# Patient Record
Sex: FEMALE | Race: WHITE | NOT HISPANIC OR LATINO | Employment: OTHER | URBAN - METROPOLITAN AREA
[De-identification: names, ages, dates, MRNs, and addresses within clinical notes are randomized per-mention and may not be internally consistent; named-entity substitution may affect disease eponyms.]

---

## 2017-08-16 ENCOUNTER — OFFICE VISIT (OUTPATIENT)
Dept: URGENT CARE | Facility: CLINIC | Age: 67
End: 2017-08-16
Payer: MEDICARE

## 2017-08-16 VITALS
SYSTOLIC BLOOD PRESSURE: 102 MMHG | HEIGHT: 65 IN | DIASTOLIC BLOOD PRESSURE: 62 MMHG | WEIGHT: 104 LBS | TEMPERATURE: 98.1 F | OXYGEN SATURATION: 96 % | HEART RATE: 78 BPM | RESPIRATION RATE: 12 BRPM | BODY MASS INDEX: 17.33 KG/M2

## 2017-08-16 DIAGNOSIS — M32.9 SYSTEMIC LUPUS ERYTHEMATOSUS, UNSPECIFIED SLE TYPE, UNSPECIFIED ORGAN INVOLVEMENT STATUS (HCC): Primary | ICD-10-CM

## 2017-08-16 DIAGNOSIS — L50.9 URTICARIA: ICD-10-CM

## 2017-08-16 PROCEDURE — 99204 OFFICE O/P NEW MOD 45 MIN: CPT | Performed by: PHYSICIAN ASSISTANT

## 2017-08-16 RX ORDER — TRIAMCINOLONE ACETONIDE 1 MG/G
1 OINTMENT TOPICAL 2 TIMES DAILY
Qty: 30 G | Refills: 1 | Status: SHIPPED | OUTPATIENT
Start: 2017-08-16

## 2017-08-16 RX ORDER — PANTOPRAZOLE SODIUM 40 MG/1
40 TABLET, DELAYED RELEASE ORAL DAILY
COMMUNITY

## 2017-08-16 RX ORDER — VALSARTAN 80 MG/1
80 TABLET ORAL DAILY
COMMUNITY

## 2017-08-16 RX ORDER — PREDNISONE 10 MG/1
TABLET ORAL
Qty: 48 TAB | Refills: 0 | Status: SHIPPED | OUTPATIENT
Start: 2017-08-16 | End: 2017-09-05

## 2017-08-16 RX ORDER — PANTOPRAZOLE SODIUM 40 MG/1
40 TABLET, DELAYED RELEASE ORAL DAILY
Qty: 30 TAB | Refills: 1 | Status: SHIPPED | OUTPATIENT
Start: 2017-08-16

## 2017-08-16 ASSESSMENT — ENCOUNTER SYMPTOMS
NEUROLOGICAL NEGATIVE: 1
PSYCHIATRIC NEGATIVE: 1
RESPIRATORY NEGATIVE: 1
CONSTITUTIONAL NEGATIVE: 1
CARDIOVASCULAR NEGATIVE: 1
CONSTIPATION: 1
DIARRHEA: 1
ABDOMINAL PAIN: 1
EYES NEGATIVE: 1

## 2017-08-16 NOTE — PROGRESS NOTES
Subjective:      Fátima Dobson is a 67 y.o. female who presents with Other            Other  Associated symptoms include abdominal pain and a rash.     Chief Complaint   Patient presents with   • Other     Hives       HPI:  Fátima Dobson is a 67 y.o. female who presents with hives for 3 days.      Has lupus and sjogren's.  WAs put on prednisone pulse and taper for 3 weeks earlier in the spring and did improve.  Took benadryl and antihistamines and sudafed.  Patient denies HA, SOB, chest pain, palpitations, fever, chills, or n/v/d.    Father passed away from renal involvement of lupus.  Patient has had 2 miscarriages.  No evidence of renal involvement.    Did recover from lower respiratory tract infection in June and was on a sulfa based antibiotic.  Patient states she did notice a slight side effect to the sulfa abx.  Flare (rash) started thereafter.       Past Medical History   Diagnosis Date   • Hypertension    • Lupus (CMS-HCC)    • Indigestion    • Other specified disorder of intestines    • Pain    • Bronchitis      fall of 2014   • History of Guillain-Cantril syndrome 2006   • Cancer (CMS-[facility]) Nov or Dec 2014     skin, basal cell   • Pneumonia 2006     and just about yearly   • Anesthesia      woken up during surgery   • History of Sjogren's disease        Past Surgical History   Procedure Laterality Date   • Hip arthroplasty total     • Other orthopedic surgery  1/8/2008     Left rotator cuff   • Other orthopedic surgery  1/2009     Left thumb   • Other  2006     tracheostomy, for Guillain Cantril   • Michelle by laparoscopy  1/23/2015     Performed by Moises Leach M.D. at SURGERY [facility]       History reviewed. No pertinent family history.    Social History     Social History   • Marital Status:      Spouse Name: N/A   • Number of Children: N/A   • Years of Education: N/A     Occupational History   • Not on file.     Social History Main Topics   • Smoking status: Never Smoker   "  • Smokeless tobacco: Never Used   • Alcohol Use: No   • Drug Use: No   • Sexual Activity: Not on file     Other Topics Concern   • Not on file     Social History Narrative         Current outpatient prescriptions:   •  valsartan, 80 mg, Oral, DAILY, 8/16/2017  •  pantoprazole, 40 mg, Oral, DAILY  •  cyclosporin, 1 Drop, Both Eyes, BID, 8/16/2017 at 0500  •  oxycodone immediate-release, 5 mg, Oral, Q4HRS PRN  •  sulfamethoxazole-trimethoprim, 1 Tab, Oral, Q12HRS, 1/22/2015 at 1800  •  metronidazole, 500 mg, Oral, BID, 1/22/2015 at 1800    Allergies   Allergen Reactions   • Bloodless      Not to get any blood due to lupus and Sjogren's   • Glucosamine Hives   • Gluten Meal    • Lyrica [Pregabalin]      Psychosis   • Neurontin [Gabapentin]      psychosis   • Tylenol [Acetaminophen] Hives and Rash     Big hives   • Ultram [Tramadol] Rash and Nausea     Rash     • Valerian Hives            Review of Systems   Constitutional: Negative.    HENT: Negative.    Eyes: Negative.    Respiratory: Negative.    Cardiovascular: Negative.    Gastrointestinal: Positive for abdominal pain, diarrhea and constipation.   Genitourinary: Negative.    Musculoskeletal: Negative for joint pain.   Skin: Positive for itching and rash.   Neurological: Negative.    Endo/Heme/Allergies: Negative.    Psychiatric/Behavioral: Negative.           Objective:     /62 mmHg  Pulse 78  Temp(Src) 36.7 °C (98.1 °F)  Resp 12  Ht 1.651 m (5' 5\")  Wt 47.174 kg (104 lb)  BMI 17.31 kg/m2  SpO2 96%     Physical Exam       Nursing note and vital signs reviewed.    Constitutional:  Appropriately groomed, pleasant affect, well nourished, and in no acute distress.    HEENT:  Head: Atraumatic, normocephalic.    Eyes:  EOMs full.  Conjunctivae clear, sclera white, and medial canthus without exudate bilaterally.    Ears:  Hearing grossly intact to voice.    Neck:  FROM.  No anterior cervical chain lymphadenopathy. Thyroid nonpalpable, without masses or " nodules. No supraclavicular lymphadenopathy to palpation.    Lungs:  Lungs with normal respiratory excursion and effort.      Muscle skeletal:  Gait and station wnl, non antalgic.    Derm:  Macular papular rash present over inguinal areas and under breasts and back.  Malar rash across face.Overall good turgor pressure.     Psychiatric:  Normal judgement, mood and affect.        Assessment/Plan:     1. Systemic lupus erythematosus, unspecified SLE type, unspecified organ involvement status (CMS-[facility])  predniSONE (DELTASONE) 10 MG Tab    pantoprazole (PROTONIX) 40 MG Tablet Delayed Response    triamcinolone acetonide (KENALOG) 0.1 % Ointment    REFERRAL TO RHEUMATOLOGY   2. Urticaria  triamcinolone acetonide (KENALOG) 0.1 % Ointment    REFERRAL TO RHEUMATOLOGY      PAtient presents with suspected lupus flare.  Strong family hx of lupus with renal involvement.  Patient presents today to Urgent Care with malar rash and macular papular urticarial like rash to inguinal area and torso and back.  No evidence of secondary infection at this time.  Plan to tx with extended prednisone pulse with taper.  Advised immediate f/u within next 2 weeks for recheck with PCP.  Referral to Rheumatology for further evaluation and management.     Patient was in agreement with this treatment plan and seemed to understand without barriers. All questions were encouraged and answered.  Reviewed signs and symptoms of when to seek emergency medical care.

## 2017-08-16 NOTE — Clinical Note
August 16, 2017         Patient: Fátima Dobson   YOB: 1950   Date of Visit: 8/16/2017           To Whom it May Concern:    Fátima Dobson was seen in my clinic on 8/16/2017. I highly recommend follow-up with her PCP within the next 2 weeks due to concern for high dose and duration of oral steroids.  This requires re-evaluation and prompt management to monitor for improvement and adverse effects.    If you have any questions or concerns, please don't hesitate to call.        Sincerely,           Dominik Wheeler PA-C  Electronically Signed

## 2017-08-16 NOTE — PATIENT INSTRUCTIONS
Avoid soap for now.  Topical creams: Cetaphil topical (tub).  Topical steriod as needed.  Follow-up in the next 1-2 weeks with your PCP.